# Patient Record
Sex: FEMALE | Race: WHITE
[De-identification: names, ages, dates, MRNs, and addresses within clinical notes are randomized per-mention and may not be internally consistent; named-entity substitution may affect disease eponyms.]

---

## 2021-09-27 ENCOUNTER — HOSPITAL ENCOUNTER (OUTPATIENT)
Dept: HOSPITAL 35 - PAC | Age: 61
End: 2021-09-27
Attending: SPECIALIST
Payer: COMMERCIAL

## 2021-09-27 DIAGNOSIS — M51.26: Primary | ICD-10-CM

## 2021-09-27 DIAGNOSIS — I10: ICD-10-CM

## 2021-09-27 LAB
ALBUMIN SERPL-MCNC: 3.9 G/DL (ref 3.4–5)
ALT SERPL-CCNC: 36 U/L (ref 30–65)
ANION GAP SERPL CALC-SCNC: 11 MMOL/L (ref 7–16)
APTT BLD: 27.7 SECONDS (ref 24.5–32.8)
AST SERPL-CCNC: 25 U/L (ref 15–37)
BILIRUB SERPL-MCNC: 0.3 MG/DL (ref 0.2–1)
BILIRUB UR-MCNC: NEGATIVE MG/DL
BUN SERPL-MCNC: 14 MG/DL (ref 7–18)
CALCIUM SERPL-MCNC: 8.9 MG/DL (ref 8.5–10.1)
CHLORIDE SERPL-SCNC: 105 MMOL/L (ref 98–107)
CO2 SERPL-SCNC: 25 MMOL/L (ref 21–32)
COLOR UR: YELLOW
CREAT SERPL-MCNC: 1.2 MG/DL (ref 0.6–1)
ERYTHROCYTE [DISTWIDTH] IN BLOOD BY AUTOMATED COUNT: 12.9 % (ref 10.5–14.5)
GLUCOSE SERPL-MCNC: 106 MG/DL (ref 74–106)
HCT VFR BLD CALC: 39.9 % (ref 37–47)
HGB BLD-MCNC: 13.1 GM/DL (ref 12–15)
INR PPP: 0.99
KETONES UR STRIP-MCNC: NEGATIVE MG/DL
MCH RBC QN AUTO: 32.3 PG (ref 26–34)
MCHC RBC AUTO-ENTMCNC: 32.9 G/DL (ref 28–37)
MCV RBC: 98.3 FL (ref 80–100)
PLATELET # BLD: 236 THOU/UL (ref 150–400)
POTASSIUM SERPL-SCNC: 3.6 MMOL/L (ref 3.5–5.1)
PROT SERPL-MCNC: 6.6 G/DL (ref 6.4–8.2)
PROTHROMBIN TIME: 10.8 SECONDS (ref 10.5–12.1)
RBC # BLD AUTO: 4.06 MIL/UL (ref 4.2–5)
RBC # UR STRIP: (no result) /UL
RBC #/AREA URNS HPF: (no result) /HPF
SODIUM SERPL-SCNC: 141 MMOL/L (ref 136–145)
SP GR UR STRIP: 1.01 (ref 1–1.03)
SQUAMOUS: (no result) /LPF (ref 0–3)
URINE CLARITY: CLEAR
URINE GLUCOSE-RANDOM*: NEGATIVE
URINE LEUKOCYTES-REFLEX: NEGATIVE
URINE NITRITE-REFLEX: NEGATIVE
URINE PROTEIN (DIPSTICK): NEGATIVE
URINE WBC-REFLEX: (no result) /HPF (ref 0–5)
UROBILINOGEN UR STRIP-ACNC: 0.2 E.U./DL (ref 0.2–1)
WBC # BLD AUTO: 8.4 THOU/UL (ref 4–11)

## 2021-09-27 NOTE — EKG
[address]  [address]
Phone:  [phone]                    ELECTROCARDIOGRAM REPORT      
_______________________________________________________________________________
 
Name:       EDDIE FOX              [facility]                     [id number]     Account #:      [id number]  
Admission:  21    Attend Phys:    Hunter Wilks, 
Discharge:              Date of Birth:  [birth year]  
                                                          Report #: [id number]
   [id number]
_______________________________________________________________________________
                          [facility]
                                       
Test Date:    2021               Test Time:    11:38:29
Pat Name:     EDDIE FOX              Department:   
Patient ID:   [id number]            Room:          
Gender:       F                        Technician:   ORLY PICKARD
:          1960               Requested By: Hunter Wilks
Order Number: [id number] MD:   Ab Cam
                                 Measurements
Intervals                              Axis          
Rate:         66                       P:            65
IA:           163                      QRS:          58
QRSD:         87                       T:            60
QT:           410                                    
QTc:          430                                    
                           Interpretive Statements
Sinus rhythm
No previous ECG available for comparison
Electronically Signed On 2021 11:52:33 CDT by Ab Cam
[email or web address]
 
 
 
 
 
 
 
 
 
 
 
 
 
 
 
 
 
 
 
 
 
 
 
  <ELECTRONICALLY SIGNED>
   By: Ab Cam MD, [facility]    
  21     1152
D: 21 1138                           _____________________________________
T: 21 1138                           Ab Cam MD, FACC      /EPI

## 2021-10-04 ENCOUNTER — HOSPITAL ENCOUNTER (OUTPATIENT)
Dept: HOSPITAL 35 - OR | Age: 61
Discharge: HOME | End: 2021-10-04
Attending: SPECIALIST
Payer: COMMERCIAL

## 2021-10-04 VITALS — SYSTOLIC BLOOD PRESSURE: 135 MMHG | DIASTOLIC BLOOD PRESSURE: 58 MMHG

## 2021-10-04 VITALS — BODY MASS INDEX: 28.25 KG/M2 | HEIGHT: 67.01 IN | WEIGHT: 180 LBS

## 2021-10-04 VITALS — DIASTOLIC BLOOD PRESSURE: 84 MMHG | SYSTOLIC BLOOD PRESSURE: 174 MMHG

## 2021-10-04 VITALS — DIASTOLIC BLOOD PRESSURE: 58 MMHG | SYSTOLIC BLOOD PRESSURE: 135 MMHG

## 2021-10-04 DIAGNOSIS — Z86.718: ICD-10-CM

## 2021-10-04 DIAGNOSIS — K21.9: ICD-10-CM

## 2021-10-04 DIAGNOSIS — Z88.8: ICD-10-CM

## 2021-10-04 DIAGNOSIS — M81.0: ICD-10-CM

## 2021-10-04 DIAGNOSIS — K50.90: ICD-10-CM

## 2021-10-04 DIAGNOSIS — F41.9: ICD-10-CM

## 2021-10-04 DIAGNOSIS — Z98.890: ICD-10-CM

## 2021-10-04 DIAGNOSIS — M19.90: ICD-10-CM

## 2021-10-04 DIAGNOSIS — F32.9: ICD-10-CM

## 2021-10-04 DIAGNOSIS — M79.7: ICD-10-CM

## 2021-10-04 DIAGNOSIS — Z90.49: ICD-10-CM

## 2021-10-04 DIAGNOSIS — Z79.899: ICD-10-CM

## 2021-10-04 DIAGNOSIS — I10: ICD-10-CM

## 2021-10-04 DIAGNOSIS — Z20.822: ICD-10-CM

## 2021-10-04 DIAGNOSIS — M51.36: Primary | ICD-10-CM

## 2021-10-04 DIAGNOSIS — Z79.01: ICD-10-CM

## 2021-10-04 PROCEDURE — 50402: CPT

## 2021-10-04 PROCEDURE — 58457: CPT

## 2021-10-04 PROCEDURE — 50010 RENAL EXPLORATION: CPT

## 2021-10-04 PROCEDURE — 55340: CPT

## 2021-10-04 PROCEDURE — 56528: CPT

## 2021-10-04 PROCEDURE — 62110: CPT

## 2021-10-04 PROCEDURE — 56532: CPT

## 2021-10-04 PROCEDURE — 62900: CPT

## 2021-10-04 PROCEDURE — 50101: CPT

## 2021-10-04 PROCEDURE — 57103: CPT

## 2021-10-04 PROCEDURE — 58567: CPT

## 2021-10-04 PROCEDURE — 70005: CPT

## 2021-10-04 NOTE — NUR
WALKED IN PATIENT ROOM TO FIND PATIENT'S  AMBULATING PATIENT, EDUCATED
ON IMPORTANCE OF CALLING PRIOR TO GETTING PATIENT OUT OF BED. NO GAIT BELT OR
WALKER WAS BEING USED. THIS NURSE PUT GAIT BELT ON PATIENT TO AMBULATE BACK TO
BED, AND PATIENT WAS RESISTANT TO USE. PATIENT BACK IN BED SAFELY AND BED
ALARM ON AT THIS TIME.

## 2021-10-04 NOTE — NUR
ASSESSMENT: BRENDAN REVIEWED CHART AND SPOKE WITH PT AND HER SPOUSE AT THE BEDSIDE.
PT IS S/P L3/4 DISCECTOMY. PT LIVES IN A HOUSE WITH HER . PT REPORTS 4
STEPS WITH HANDRAIL WHEN ENTERING THE HOME THROUGH THE FRONT. PT REPORTS ONCE
INSIDE SHE DOES NOT HAVE ANY STEPS SHE HAS TO USE. PT REPORTS SHE HAS A CANE
AT HOME BUT NO WALKER. PHYSICAL THERAPY WORKED WITH PATIENT AND RECOMMENDING A
WALKER FOR HOME. PT IS AGREEABLE AND HAS NO PREFERENCE OF [employer]. BRENDAN
CONTACTED [place] [employer] PRIOR AUTH LINE -[id number] AND RECEIVED APPROVAL
FOR WALKER. BRENDAN SPOKE WITH ALIZA AT [place] [employer] AND GOT APPROVAL #[id number]. CM
NOTIFIED LIASON AT [employer] WHO STATES SHE WILL COME DELIVER A WALKER TO
PATIENTS ROOM. PT DOES NOT ANTICIPATE ANY FURTHER NEEDS FROM BRENDAN.

## 2021-10-04 NOTE — NUR
Pt received discharge orders to home. Pt is A & O x4. Pt VS stable. Pt
ambulates independently with walker. Pt denies pain/discomfort at this time.
pt is room air. Pt was irritable this shift. Pt had  at bedside. Pt
wheeled to front enterence in wheelchair by staff and pt left hospital with
personal belongings in private vehicle